# Patient Record
Sex: FEMALE | Race: WHITE | NOT HISPANIC OR LATINO | Employment: FULL TIME | URBAN - METROPOLITAN AREA
[De-identification: names, ages, dates, MRNs, and addresses within clinical notes are randomized per-mention and may not be internally consistent; named-entity substitution may affect disease eponyms.]

---

## 2023-12-19 ENCOUNTER — OFFICE VISIT (OUTPATIENT)
Dept: OPHTHALMOLOGY | Facility: CLINIC | Age: 68
End: 2023-12-19
Payer: COMMERCIAL

## 2023-12-19 DIAGNOSIS — H52.4 MYOPIA OF BOTH EYES WITH ASTIGMATISM AND PRESBYOPIA: ICD-10-CM

## 2023-12-19 DIAGNOSIS — H35.372 EPIRETINAL MEMBRANE (ERM) OF LEFT EYE: Primary | ICD-10-CM

## 2023-12-19 DIAGNOSIS — H25.813 COMBINED FORMS OF AGE-RELATED CATARACT OF BOTH EYES: ICD-10-CM

## 2023-12-19 DIAGNOSIS — H52.203 MYOPIA OF BOTH EYES WITH ASTIGMATISM AND PRESBYOPIA: ICD-10-CM

## 2023-12-19 DIAGNOSIS — H52.13 MYOPIA OF BOTH EYES WITH ASTIGMATISM AND PRESBYOPIA: ICD-10-CM

## 2023-12-19 DIAGNOSIS — H35.89 RPE MOTTLING OF MACULA: ICD-10-CM

## 2023-12-19 PROCEDURE — 92015 DETERMINE REFRACTIVE STATE: CPT | Performed by: OPTOMETRIST

## 2023-12-19 PROCEDURE — 92014 COMPRE OPH EXAM EST PT 1/>: CPT | Performed by: OPTOMETRIST

## 2023-12-19 PROCEDURE — 92134 CPTRZ OPH DX IMG PST SGM RTA: CPT | Mod: BILATERAL PROCEDURE | Performed by: OPTOMETRIST

## 2023-12-19 RX ORDER — DICLOFENAC SODIUM 10 MG/G
GEL TOPICAL
COMMUNITY

## 2023-12-19 RX ORDER — CHOLECALCIFEROL (VITAMIN D3) 25 MCG
2000 TABLET ORAL
COMMUNITY
Start: 2021-03-18

## 2023-12-19 ASSESSMENT — REFRACTION_WEARINGRX
OS_SPHERE: -2.00
OD_CYLINDER: -0.75
OD_AXIS: 150
OD_SPHERE: -1.75

## 2023-12-19 ASSESSMENT — CONF VISUAL FIELD
OS_INFERIOR_TEMPORAL_RESTRICTION: 0
OD_INFERIOR_TEMPORAL_RESTRICTION: 0
OS_NORMAL: 1
OS_SUPERIOR_TEMPORAL_RESTRICTION: 0
METHOD: COUNTING FINGERS
OS_INFERIOR_NASAL_RESTRICTION: 0
OD_NORMAL: 1
OS_SUPERIOR_NASAL_RESTRICTION: 0
OD_SUPERIOR_NASAL_RESTRICTION: 0
OD_INFERIOR_NASAL_RESTRICTION: 0
OD_SUPERIOR_TEMPORAL_RESTRICTION: 0

## 2023-12-19 ASSESSMENT — TONOMETRY
OD_IOP_MMHG: 16
IOP_METHOD: GOLDMANN APPLANATION
OS_IOP_MMHG: 16

## 2023-12-19 ASSESSMENT — REFRACTION_MANIFEST
OD_AXIS: 155
OS_ADD: +2.50
OS_SPHERE: -1.75
OD_ADD: +2.50
OD_CYLINDER: -0.75
OD_SPHERE: -2.25

## 2023-12-19 ASSESSMENT — VISUAL ACUITY
CORRECTION_TYPE: GLASSES
OD_SC: J1+
METHOD: SNELLEN - LINEAR
OS_CC: 20/30
OD_CC: 20/25
OS_SC: J1+

## 2023-12-19 ASSESSMENT — SLIT LAMP EXAM - LIDS
COMMENTS: GOOD POSITION, 1+ MGD
COMMENTS: GOOD POSITION, 1+ MGD

## 2023-12-19 ASSESSMENT — CUP TO DISC RATIO
OS_RATIO: .5
OD_RATIO: .5

## 2023-12-19 ASSESSMENT — EXTERNAL EXAM - RIGHT EYE: OD_EXAM: NORMAL

## 2023-12-19 ASSESSMENT — ENCOUNTER SYMPTOMS: EYES NEGATIVE: 1

## 2023-12-19 ASSESSMENT — EXTERNAL EXAM - LEFT EYE: OS_EXAM: NORMAL

## 2023-12-19 NOTE — PROGRESS NOTES
Epiretinal membrane OS is stable.  Optical coherence tomography of the macula revealed:    OD: Normal foveal contour, photoreceptor, retinal pigment epithelium, IS/OS junction, central field 278 was 293 was 288 micron.  Vitreous base visualized.  No progression from last year. 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 Will monitor annually as well.   OS:  Abnormal foveal contour with more elevation nasally, normal foveal depression, photoreceptor, retinal pigment epithelium, IS/OS junction, central field 312 was 335 was 317 was 310 micron.  Slight progression.  Vitreous base not visualized.  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 No progression from last year.      A spectacle prescription was given at the patient`s request.   VA is 20/25 OD was 20/20 and 20/25 OS was 20/25.  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  RTc 1 year for manifest refraction (MR) BAT full and OCT macula.

## 2024-12-21 ENCOUNTER — APPOINTMENT (OUTPATIENT)
Dept: OPHTHALMOLOGY | Facility: CLINIC | Age: 69
End: 2024-12-21
Payer: COMMERCIAL

## 2025-07-30 ENCOUNTER — APPOINTMENT (OUTPATIENT)
Dept: OPHTHALMOLOGY | Facility: CLINIC | Age: 70
End: 2025-07-30
Payer: COMMERCIAL

## 2025-07-30 ENCOUNTER — TELEPHONE (OUTPATIENT)
Dept: OPHTHALMOLOGY | Facility: CLINIC | Age: 70
End: 2025-07-30

## 2025-10-01 ENCOUNTER — APPOINTMENT (OUTPATIENT)
Dept: OPHTHALMOLOGY | Facility: CLINIC | Age: 70
End: 2025-10-01
Payer: COMMERCIAL